# Patient Record
(demographics unavailable — no encounter records)

---

## 2024-10-15 NOTE — PHYSICAL EXAM
[No Acute Distress] : no acute distress [Well Nourished] : well nourished [Well Developed] : well developed [Well-Appearing] : well-appearing [Normal Voice/Communication] : normal voice/communication [Normal Mood] : the mood was normal [de-identified] : bilateral varicosities [de-identified] : tenderness on palpation of left anterolateral leg [de-identified] : gait is antalgic

## 2024-10-15 NOTE — HISTORY OF PRESENT ILLNESS
[FreeTextEntry8] : pt c/o left leg +pain +swelling  x 3 days.  She stepped onto a truck that had a high step and she developed pain in the left leg, toward the front of the lower leg.  She tried ice and heat.  She tried taking her 's Robaxin without relief.  The pain is relieved with sitting and worse when standing and walking.

## 2024-10-15 NOTE — PLAN
[FreeTextEntry1] : She was advised that she likely partially tore the muscle in her leg.  I ordered a doppler to rule out a DCT.  She is unable to take NSAIDS due to her bariatric surgery.  She will take cyclobenzaprine 10 mg tid prn but is aware not to take it when driving or if she has to be alert.  She will call if the symptoms persist or worsen.  A high dose flu vaccine was administered today.

## 2025-04-23 NOTE — PHYSICAL EXAM
[No Acute Distress] : no acute distress [Well Nourished] : well nourished [Well Developed] : well developed [Well-Appearing] : well-appearing [Normal Voice/Communication] : normal voice/communication [No Respiratory Distress] : no respiratory distress  [No Accessory Muscle Use] : no accessory muscle use [Clear to Auscultation] : lungs were clear to auscultation bilaterally [Normal Appearance] : normal in appearance [No Masses] : no palpable masses [No Nipple Discharge] : no nipple discharge [No Axillary Lymphadenopathy] : no axillary lymphadenopathy [Urethral Meatus] : normal urethra [Urinary Bladder Findings] : the bladder was normal on palpation [External Female Genitalia] : normal external genitalia [Vagina] : normal vaginal exam [Cervix] : normal cervix [Uterus] : uterus was normal size, without masses or tenderness [Anus Abnormality] : the anus and perineum were normal [de-identified] : irregular

## 2025-04-23 NOTE — PLAN
[FreeTextEntry1] : A PAP was obtained and will be sent to [facility] for analysis.  I reviewed the mammogram results with her and ordered a calcium score and she will follow up with her cardiologist as scheduled.  I ordered an EKG and it was performed in the office today and was stable when compared with a prior one.  We will obtain her eye exam report.

## 2025-04-23 NOTE — HISTORY OF PRESENT ILLNESS
[FreeTextEntry1] : pt presents for pap exam  [de-identified] : She lost a few friends recently and she felt a little breathless. Her heart has been fluttering. She feels better now.  Her LMP was at age 47.